# Patient Record
Sex: MALE | Race: OTHER | URBAN - METROPOLITAN AREA
[De-identification: names, ages, dates, MRNs, and addresses within clinical notes are randomized per-mention and may not be internally consistent; named-entity substitution may affect disease eponyms.]

---

## 2017-01-23 NOTE — PATIENT DISCUSSION
(H25.13) Age-related nuclear cataract, bilateral - Assesment : Examination revealed cataract. OD>OS. ADVISED PATIENT SHE MAY PROCEED WITH CATARACT SURGERY TO MAXIMIZE THE VISION. - Plan : PATIENT WISHES TO OBSERVE AT THIS TIME DUE SO FAMILY OBLIGATIONS.

## 2017-01-23 NOTE — PATIENT DISCUSSION
(V27.3478) Primary open-angle glaucoma bilateral mild stage - Assesment : Examination revealed Primary Open Angle Glaucoma. IOP OU STABLE. BOTH EYES MEASURED 9 BY TONOMETRY TODAY. - Plan : Monitor for changes. Advised patient to call our office when decreased vision or increased eye pain. Follow up with [facility]ologist as scheduled for tension checks.   SEPT/ EXAM/ ON OCT WITH DR. Carmela Corea

## 2018-02-22 NOTE — PATIENT DISCUSSION
(V20.3158) Primary open-angle glaucoma bilateral mild stage - Assesment : Examination revealed Primary Open Angle Glaucoma. OD: THIN INFERIOR RIM INCREASED C/D  IOP OU CONTROLLED NICELY ON LATANOPROST OU. - Plan : Monitor for changes. Advised patient to call our office when decreased vision or increased eye pain.  CPM: LATANOPROST OU QD  FOLLOW UP WITH OPHTHALMOLOGIST IN 4 MONTHS FOR TENSION CHECK 24-2.  1 YEAR EXAM WITH DR. Mohini Lara , ON OCT

## 2018-02-22 NOTE — PATIENT DISCUSSION
(H25.13) Age-related nuclear cataract, bilateral - Assesment : Examination revealed cataract. -PROGRESSING ADVISED PATIENT SHE MAY PROCEED WITH CATARACT SURGERY ANY TIME TO IMPROVE THE QUALITY AND CLARITY OF HER VISION. - Plan : Monitor for changes. Advised patient to call our office with decreased vision or an increase in symptoms.

## 2019-03-21 NOTE — PATIENT DISCUSSION
(C33.2833) Primary open-angle glaucoma bilateral mild stage - Assesment : Examination revealed Primary Open Angle Glaucoma. OD: THIN INFERIOR RIM INCREASED C/D  IOP OU CONTROLLED NICELY ON LATANOPROST OU. - Plan : Monitor for changes. Advised patient to call our office when decreased vision or increased eye pain.  CPM: LATANOPROST OU QD  FOLLOW UP WITH OPHTHALMOLOGIST UP NORTH 1 YEAR GL TN CHECK  WITH DR. Manya Lesches , ON OCT

## 2022-01-28 NOTE — PATIENT DISCUSSION
START WITH OS. EYE OS, IOL TYPE TORIC, POST OPERATIVE TARGET PL, PACKAGE CUSTOM. RECOMMEND iSTENT. PT WISHES TO PROCEED .

## 2022-01-28 NOTE — PATIENT DISCUSSION
The types of intraocular lenses were reviewed with the patient along with a discussion of their various strengths and weaknesses. Pt denies traumas or double VA . Discussed pt's lifestyle and VA expectations .

## 2022-01-28 NOTE — PATIENT DISCUSSION
CONSIDER OD TO FOLLOW. EYE OD, IOL TYPE TORIC, POST OPERATIVE TARGET PL/-0.50, PACKAGE CUSTOM. RECOMMEND iSTENT. PT WISHES TO PROCEED .

## 2022-01-28 NOTE — PATIENT DISCUSSION
Discussed distance vs. near target and patient elects distance. Pt understands she will need readers .

## 2022-02-04 ENCOUNTER — PREPPED CHART (OUTPATIENT)
Dept: URBAN - METROPOLITAN AREA CLINIC 50 | Facility: CLINIC | Age: 67
End: 2022-02-04

## 2022-02-10 NOTE — PATIENT DISCUSSION
Heme temporal AC more likely coming from iStent . Advised to keep head elevated , wear eye shield to sleep , no vigorous activity , avoid blood thinners D/C ASA.

## 2022-02-10 NOTE — PATIENT DISCUSSION
RBA's for iStent discussed with patient, patient wishes to proceed. Otc Regimen: Hydrocortisone 1% cream twice daily x 2-3 days for any itchy areas, otherwise moisturizing lotion twice daily Detail Level: Zone Plan: Rash is already resolving, may use OTC hydrocortisone for a few days prn.  RTC for any recurrence

## 2022-02-16 NOTE — PATIENT DISCUSSION
EYE OD, IOL TYPE TORIC, POST OPERATIVE TARGET PL/-0.50, PACKAGE CUSTOM. RECOMMEND iSTENT. PT WISHES TO PROCEED .

## 2022-02-16 NOTE — PATIENT DISCUSSION
Heme temporal AC more likely coming from iStent . Advised to keep head elevated , wear eye shield to sleep , no vigorous activity , avoid blood thinners D/C ASA. Resolved.

## 2022-03-01 ENCOUNTER — NEW PATIENT (OUTPATIENT)
Dept: URBAN - METROPOLITAN AREA CLINIC 50 | Facility: CLINIC | Age: 67
End: 2022-03-01

## 2022-03-01 DIAGNOSIS — H26.493: ICD-10-CM

## 2022-03-01 PROCEDURE — 92134 CPTRZ OPH DX IMG PST SGM RTA: CPT

## 2022-03-01 PROCEDURE — 92015 DETERMINE REFRACTIVE STATE: CPT

## 2022-03-01 PROCEDURE — 92004 COMPRE OPH EXAM NEW PT 1/>: CPT

## 2022-03-01 ASSESSMENT — VISUAL ACUITY
OD_SC: 20/50
OU_CC: 20/25
OS_CC: 20/25
OS_SC: J7@14IN
OD_GLARE: 20/25
OU_SC: J7@14IN
OD_PH: 20/25-2
OS_GLARE: 20/30
OU_SC: 20/40
OD_GLARE: 20/40
OD_CC: 20/30-1
OS_SC: 20/50
OS_CC: J1+@LAP
OD_SC: J10@14IN
OU_CC: J1+@LAP
OS_GLARE: 20/25
OD_CC: J1+@LAP

## 2022-03-01 ASSESSMENT — KERATOMETRY
OS_AXISANGLE2_DEGREES: 169
OS_K2POWER_DIOPTERS: 42.50
OD_K2POWER_DIOPTERS: 41.75
OD_AXISANGLE2_DEGREES: 50
OS_AXISANGLE_DEGREES: 79
OS_K1POWER_DIOPTERS: 41.75
OD_K1POWER_DIOPTERS: 41.25
OD_AXISANGLE_DEGREES: 140

## 2022-03-01 ASSESSMENT — TONOMETRY
OS_IOP_MMHG: 8
OD_IOP_MMHG: 8
